# Patient Record
Sex: FEMALE | Race: WHITE | URBAN - METROPOLITAN AREA
[De-identification: names, ages, dates, MRNs, and addresses within clinical notes are randomized per-mention and may not be internally consistent; named-entity substitution may affect disease eponyms.]

---

## 2017-08-02 NOTE — PATIENT DISCUSSION
Contact lens trial given to patient. pt to decide on mono vs dist. Pt has been having a hard time adapting to mono va OS.

## 2017-08-04 NOTE — PATIENT DISCUSSION
Contact lens trial given to patient. pt to decided to keep monovision secondary to int diplopia with contact lens os for distance.

## 2017-11-15 ENCOUNTER — IMPORTED ENCOUNTER (OUTPATIENT)
Dept: URBAN - METROPOLITAN AREA CLINIC 50 | Facility: CLINIC | Age: 77
End: 2017-11-15

## 2017-12-15 ENCOUNTER — IMPORTED ENCOUNTER (OUTPATIENT)
Dept: URBAN - METROPOLITAN AREA CLINIC 50 | Facility: CLINIC | Age: 77
End: 2017-12-15

## 2018-01-31 ENCOUNTER — IMPORTED ENCOUNTER (OUTPATIENT)
Dept: URBAN - METROPOLITAN AREA CLINIC 50 | Facility: CLINIC | Age: 78
End: 2018-01-31

## 2018-07-26 ENCOUNTER — IMPORTED ENCOUNTER (OUTPATIENT)
Dept: URBAN - METROPOLITAN AREA CLINIC 50 | Facility: CLINIC | Age: 78
End: 2018-07-26

## 2018-08-16 ENCOUNTER — IMPORTED ENCOUNTER (OUTPATIENT)
Dept: URBAN - METROPOLITAN AREA CLINIC 50 | Facility: CLINIC | Age: 78
End: 2018-08-16

## 2018-08-22 ENCOUNTER — IMPORTED ENCOUNTER (OUTPATIENT)
Dept: URBAN - METROPOLITAN AREA CLINIC 50 | Facility: CLINIC | Age: 78
End: 2018-08-22

## 2018-08-22 NOTE — PATIENT DISCUSSION
"""Dr Eustacio Rinne reviewed results with patient today. IOP controlled OU. Continue without gtts. """

## 2019-03-13 ENCOUNTER — IMPORTED ENCOUNTER (OUTPATIENT)
Dept: URBAN - METROPOLITAN AREA CLINIC 50 | Facility: CLINIC | Age: 79
End: 2019-03-13

## 2020-01-22 ENCOUNTER — IMPORTED ENCOUNTER (OUTPATIENT)
Dept: URBAN - METROPOLITAN AREA CLINIC 50 | Facility: CLINIC | Age: 80
End: 2020-01-22

## 2020-04-15 ENCOUNTER — IMPORTED ENCOUNTER (OUTPATIENT)
Dept: URBAN - METROPOLITAN AREA CLINIC 50 | Facility: CLINIC | Age: 80
End: 2020-04-15

## 2021-03-01 ENCOUNTER — IMPORTED ENCOUNTER (OUTPATIENT)
Dept: URBAN - METROPOLITAN AREA CLINIC 50 | Facility: CLINIC | Age: 81
End: 2021-03-01

## 2021-05-15 ASSESSMENT — TONOMETRY
OD_IOP_MMHG: 15
OD_IOP_MMHG: 12
OS_IOP_MMHG: 15
OS_IOP_MMHG: 13
OD_IOP_MMHG: 14
OD_IOP_MMHG: 14
OS_IOP_MMHG: 14
OS_IOP_MMHG: 16
OS_IOP_MMHG: 13
OD_IOP_MMHG: 15
OS_IOP_MMHG: 15
OS_IOP_MMHG: 14
OS_IOP_MMHG: 14
OD_IOP_MMHG: 16
OS_IOP_MMHG: 14
OD_IOP_MMHG: 15
OD_IOP_MMHG: 15
OS_IOP_MMHG: 13
OD_IOP_MMHG: 14
OD_IOP_MMHG: 13

## 2021-05-15 ASSESSMENT — VISUAL ACUITY
OD_BAT: 20/80
OS_CC: 20/25+
OD_CC: 20/20
OS_BAT: 20/50
OD_CC: 20/40
OS_CC: 20/30
OD_CC: 20/20
OD_BAT: 20/50
OD_OTHER: 20/80. 20/200.
OD_CC: 20/30
OD_CC: J1
OS_CC: 20/20
OD_CC: 20/20
OD_CC: J1-
OS_BAT: 20/40
OD_CC: J1+
OS_CC: 20/25-
OS_OTHER: 20/50. 20/70.
OS_CC: J1-
OD_CC: 20/30
OS_CC: 20/20
OS_OTHER: 20/40. 20/50.
OD_CC: 20/30
OD_OTHER: 20/50. 20/60.
OS_CC: 20/40+2
OS_CC: 20/30-2
OS_CC: J1
OS_CC: J1+

## 2021-05-15 ASSESSMENT — PACHYMETRY
OD_CT_UM: 567
OS_CT_UM: 572
OD_CT_UM: 567
OS_CT_UM: 572
OD_CT_UM: 567
OS_CT_UM: 572
OD_CT_UM: 567
OS_CT_UM: 572
OD_CT_UM: 567